# Patient Record
Sex: MALE | Race: WHITE | URBAN - METROPOLITAN AREA
[De-identification: names, ages, dates, MRNs, and addresses within clinical notes are randomized per-mention and may not be internally consistent; named-entity substitution may affect disease eponyms.]

---

## 2024-02-21 ENCOUNTER — APPOINTMENT (OUTPATIENT)
Dept: PRIMARY CARE | Facility: CLINIC | Age: 34
End: 2024-02-21
Payer: COMMERCIAL

## 2024-02-21 ENCOUNTER — OFFICE VISIT (OUTPATIENT)
Dept: PRIMARY CARE | Facility: CLINIC | Age: 34
End: 2024-02-21
Payer: COMMERCIAL

## 2024-02-21 VITALS
HEIGHT: 68 IN | SYSTOLIC BLOOD PRESSURE: 129 MMHG | WEIGHT: 129.2 LBS | HEART RATE: 96 BPM | BODY MASS INDEX: 19.58 KG/M2 | OXYGEN SATURATION: 99 % | DIASTOLIC BLOOD PRESSURE: 74 MMHG

## 2024-02-21 DIAGNOSIS — Z23 NEED FOR INFLUENZA VACCINATION: ICD-10-CM

## 2024-02-21 DIAGNOSIS — F41.9 ANXIETY: ICD-10-CM

## 2024-02-21 DIAGNOSIS — Z23 NEED FOR TDAP VACCINATION: ICD-10-CM

## 2024-02-21 DIAGNOSIS — Z00.00 ROUTINE GENERAL MEDICAL EXAMINATION AT A HEALTH CARE FACILITY: Primary | ICD-10-CM

## 2024-02-21 DIAGNOSIS — F98.8 ATTENTION DEFICIT DISORDER OF ADULT: ICD-10-CM

## 2024-02-21 PROCEDURE — 90715 TDAP VACCINE 7 YRS/> IM: CPT | Performed by: STUDENT IN AN ORGANIZED HEALTH CARE EDUCATION/TRAINING PROGRAM

## 2024-02-21 PROCEDURE — 90686 IIV4 VACC NO PRSV 0.5 ML IM: CPT | Performed by: STUDENT IN AN ORGANIZED HEALTH CARE EDUCATION/TRAINING PROGRAM

## 2024-02-21 PROCEDURE — 90472 IMMUNIZATION ADMIN EACH ADD: CPT | Performed by: STUDENT IN AN ORGANIZED HEALTH CARE EDUCATION/TRAINING PROGRAM

## 2024-02-21 PROCEDURE — 99214 OFFICE O/P EST MOD 30 MIN: CPT | Performed by: STUDENT IN AN ORGANIZED HEALTH CARE EDUCATION/TRAINING PROGRAM

## 2024-02-21 PROCEDURE — 90471 IMMUNIZATION ADMIN: CPT | Performed by: STUDENT IN AN ORGANIZED HEALTH CARE EDUCATION/TRAINING PROGRAM

## 2024-02-21 PROCEDURE — 99395 PREV VISIT EST AGE 18-39: CPT | Performed by: STUDENT IN AN ORGANIZED HEALTH CARE EDUCATION/TRAINING PROGRAM

## 2024-02-21 ASSESSMENT — ANXIETY QUESTIONNAIRES
5. BEING SO RESTLESS THAT IT IS HARD TO SIT STILL: NEARLY EVERY DAY
7. FEELING AFRAID AS IF SOMETHING AWFUL MIGHT HAPPEN: SEVERAL DAYS
1. FEELING NERVOUS, ANXIOUS, OR ON EDGE: MORE THAN HALF THE DAYS
6. BECOMING EASILY ANNOYED OR IRRITABLE: MORE THAN HALF THE DAYS
3. WORRYING TOO MUCH ABOUT DIFFERENT THINGS: NEARLY EVERY DAY
GAD7 TOTAL SCORE: 17
2. NOT BEING ABLE TO STOP OR CONTROL WORRYING: NEARLY EVERY DAY
IF YOU CHECKED OFF ANY PROBLEMS ON THIS QUESTIONNAIRE, HOW DIFFICULT HAVE THESE PROBLEMS MADE IT FOR YOU TO DO YOUR WORK, TAKE CARE OF THINGS AT HOME, OR GET ALONG WITH OTHER PEOPLE: VERY DIFFICULT
4. TROUBLE RELAXING: NEARLY EVERY DAY

## 2024-02-21 NOTE — PROGRESS NOTES
"Subjective   Patient ID: Cb Lan is a 33 y.o. male who presents for Annual Exam (CPE. Chest pain possible heart burn.).    Last Physical : ____ Years ago     Pt's PMH, PSH, SH, FH , meds and allergies was obtained / reviewed and updated .     Dental  Visits : Y  Vision issues : N  Hearing issues : N    Immunizations : Y    Diet :  Could be better   Exercise:  Not regularly  Weight concerns : None    Alcohol: as noted in   Tobacco: as noted in   Recreational drugs :  None /as noted in    ===============================    Chest pain , right sided, intermittent , transient, non exertional , non radiating . Once a day   Does not experience pain with physical activity   No FH of premature cardiac disease    Chronic heart burn   ==================================    Visit Vitals  /74   Pulse 96   Ht 1.727 m (5' 8\")   Wt 58.6 kg (129 lb 3.2 oz)   SpO2 99%   BMI 19.64 kg/m²   Smoking Status Some Days   BSA 1.68 m²      =====================  Review of Systems:    Constitutional: no chills, no fever and no night sweats.     Eyes: no blurred vision and no eyesight problems.     ENT: no hearing loss, no nasal congestion, no nasal discharge, no hoarseness and no sore throat.     Cardiovascular: no chest pain, no intermittent leg claudication, no lower extremity edema, no palpitations and no syncope.     Respiratory: no cough, no shortness of breath during exertion, no shortness of breath at rest and no wheezing.     Gastrointestinal: no abdominal pain, no constipation, no blood in stools, no diarrhea, no melena, no nausea, no rectal pain and no vomiting.     Genitourinary: no dysuria, no change in urinary frequency, no urinary hesitancy and no feelings of urinary urgency.     Musculoskeletal: no arthralgias, no back pain and no myalgias.     Integumentary: no new skin lesions and no rashes.     Neurological: no difficulty walking, no headache, no limb weakness, no numbness and no tingling.     Psychiatric: no " anxiety, no depression, no anhedonia and no substance use disorders.     Endocrine: no recent weight gain and no recent weight loss.     Hematologic/Lymphatic: no tendency for easy bruising and no swollen glands.          All other systems have been reviewed and are negative for complaint.    =====================================================    Physical exam :    Constitutional: Alert and in no acute distress. Well developed, well nourished.     Eyes: Normal external exam. Pupils were equal in size, round, reactive to light (PERRL) with normal accommodation and extraocular movements intact (EOMI).     Ears, Nose, Mouth, and Throat: External inspection of ears and nose: Normal.  Otoscopic examination: Normal.      Neck: No neck mass was observed. Supple.     Cardiovascular: Heart rate and rhythm were normal, normal S1 and S2, no gallops, no murmurs and no pericardial rub    Pulmonary: No respiratory distress. Clear bilateral breath sounds.     Abdomen: Soft nontender; no abdominal mass palpated. No organomegaly.     Musculoskeletal: No joint swelling seen, normal movements of all extremities. Range of motion: Normal.  Muscle strength/tone: Normal.      Pectus excavatum , no costochondral tenderness b/l     Neurologic: Deep tendon reflexes were 2+ and symmetric. Sensation: Normal.     Psychiatric: Judgment and insight: Intact. Mood and affect: Normal.        Assessment/Plan    Diagnoses and all orders for this visit:  Routine general medical examination at a health care facility  -     CBC; Future  -     Comprehensive Metabolic Panel; Future  -     Hemoglobin A1C; Future  -     Lipid Panel; Future  -     TSH with reflex to Free T4 if abnormal; Future  Need for influenza vaccination  -     Flu vaccine (IIV4) age 6 months and greater, preservative free  Attention deficit disorder of adult  -     Referral to Psychiatry; Future  Anxiety       34 y/o male presents to [facility] , [facility]     Chest pain  : atypical ,  transient . Less likely to be cardiac since he does not experience pain with exertion and Fh of premature cardiac disease   ? GERD / anxiety   Anxiety : GAD7 : 18  Pt reports difficulty communication with others , feels restless   Referred to Psychiatry for further mgmt     HM :   Vaccines:  -Tdap : given today   -Flu :  given today       Cancer screenings:  None at this age       General preventive care discussed which includes regular exercise, healthy eating habits, to include more of plant based diet, to include foods of all colors  , limiting excessive red meat intake , staying active to maintain a weight that is appropriate for his/ her height / making efforts to reach an ideal weight for height,  avoidance of smoking, excess alcohol consumption, avoidance of recreational drugs, safe and responsible sexual relationships and practices.     Calcium + Vit D supplements recommended   Regular exercise recommended   Healthy eating choices discussed and recommended       Conditions addressed and mgmt as noted above.  Pertinent labs, images/ imaging reports , chart review was done .   Age appropriate labs / labs for mgmt of chronic medical conditions ordered, further mgmt pending the results.

## 2024-03-11 ENCOUNTER — TELEPHONE (OUTPATIENT)
Dept: PRIMARY CARE | Facility: CLINIC | Age: 34
End: 2024-03-11

## 2024-06-17 ENCOUNTER — HOSPITAL ENCOUNTER (OUTPATIENT)
Dept: RADIOLOGY | Facility: CLINIC | Age: 34
Discharge: HOME | End: 2024-06-17
Payer: COMMERCIAL

## 2024-06-17 ENCOUNTER — APPOINTMENT (OUTPATIENT)
Dept: PRIMARY CARE | Facility: CLINIC | Age: 34
End: 2024-06-17
Payer: COMMERCIAL

## 2024-06-17 VITALS
WEIGHT: 129.8 LBS | OXYGEN SATURATION: 98 % | SYSTOLIC BLOOD PRESSURE: 111 MMHG | HEART RATE: 69 BPM | DIASTOLIC BLOOD PRESSURE: 73 MMHG | BODY MASS INDEX: 19.74 KG/M2

## 2024-06-17 DIAGNOSIS — M79.672 FOOT PAIN, BILATERAL: ICD-10-CM

## 2024-06-17 DIAGNOSIS — M79.671 FOOT PAIN, BILATERAL: Primary | ICD-10-CM

## 2024-06-17 DIAGNOSIS — M79.672 FOOT PAIN, BILATERAL: Primary | ICD-10-CM

## 2024-06-17 DIAGNOSIS — M79.671 FOOT PAIN, BILATERAL: ICD-10-CM

## 2024-06-17 PROCEDURE — 4004F PT TOBACCO SCREEN RCVD TLK: CPT | Performed by: STUDENT IN AN ORGANIZED HEALTH CARE EDUCATION/TRAINING PROGRAM

## 2024-06-17 PROCEDURE — 73630 X-RAY EXAM OF FOOT: CPT | Mod: 50

## 2024-06-17 PROCEDURE — 99214 OFFICE O/P EST MOD 30 MIN: CPT | Performed by: STUDENT IN AN ORGANIZED HEALTH CARE EDUCATION/TRAINING PROGRAM

## 2024-06-17 ASSESSMENT — ENCOUNTER SYMPTOMS
MUSCLE WEAKNESS: 0
INABILITY TO BEAR WEIGHT: 1
TINGLING: 0
LOSS OF SENSATION: 0
LOSS OF MOTION: 1

## 2024-06-17 ASSESSMENT — PAIN SCALES - GENERAL: PAINLEVEL: 2
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
## 2025-01-28 ENCOUNTER — OFFICE VISIT (OUTPATIENT)
Dept: PRIMARY CARE | Facility: CLINIC | Age: 35
End: 2025-01-28
Payer: COMMERCIAL

## 2025-01-28 VITALS
HEART RATE: 85 BPM | WEIGHT: 131.2 LBS | DIASTOLIC BLOOD PRESSURE: 71 MMHG | HEIGHT: 68 IN | SYSTOLIC BLOOD PRESSURE: 107 MMHG | OXYGEN SATURATION: 96 % | BODY MASS INDEX: 19.88 KG/M2

## 2025-01-28 DIAGNOSIS — Z00.00 ROUTINE GENERAL MEDICAL EXAMINATION AT A HEALTH CARE FACILITY: Primary | ICD-10-CM

## 2025-01-28 DIAGNOSIS — F41.1 GENERALIZED ANXIETY DISORDER: ICD-10-CM

## 2025-01-28 PROCEDURE — 99214 OFFICE O/P EST MOD 30 MIN: CPT | Performed by: STUDENT IN AN ORGANIZED HEALTH CARE EDUCATION/TRAINING PROGRAM

## 2025-01-28 PROCEDURE — 99395 PREV VISIT EST AGE 18-39: CPT | Performed by: STUDENT IN AN ORGANIZED HEALTH CARE EDUCATION/TRAINING PROGRAM

## 2025-01-28 PROCEDURE — 3008F BODY MASS INDEX DOCD: CPT | Performed by: STUDENT IN AN ORGANIZED HEALTH CARE EDUCATION/TRAINING PROGRAM

## 2025-01-28 RX ORDER — ESCITALOPRAM OXALATE 10 MG/1
10 TABLET ORAL DAILY
Qty: 30 TABLET | Refills: 11 | Status: SHIPPED | OUTPATIENT
Start: 2025-01-28 | End: 2026-01-23

## 2025-01-28 ASSESSMENT — ANXIETY QUESTIONNAIRES
3. WORRYING TOO MUCH ABOUT DIFFERENT THINGS: NEARLY EVERY DAY
IF YOU CHECKED OFF ANY PROBLEMS ON THIS QUESTIONNAIRE, HOW DIFFICULT HAVE THESE PROBLEMS MADE IT FOR YOU TO DO YOUR WORK, TAKE CARE OF THINGS AT HOME, OR GET ALONG WITH OTHER PEOPLE: EXTREMELY DIFFICULT
1. FEELING NERVOUS, ANXIOUS, OR ON EDGE: NEARLY EVERY DAY
7. FEELING AFRAID AS IF SOMETHING AWFUL MIGHT HAPPEN: NEARLY EVERY DAY
2. NOT BEING ABLE TO STOP OR CONTROL WORRYING: NEARLY EVERY DAY
GAD7 TOTAL SCORE: 21
5. BEING SO RESTLESS THAT IT IS HARD TO SIT STILL: NEARLY EVERY DAY
6. BECOMING EASILY ANNOYED OR IRRITABLE: NEARLY EVERY DAY
4. TROUBLE RELAXING: NEARLY EVERY DAY

## 2025-01-28 ASSESSMENT — PATIENT HEALTH QUESTIONNAIRE - PHQ9
SUM OF ALL RESPONSES TO PHQ9 QUESTIONS 1 AND 2: 0
2. FEELING DOWN, DEPRESSED OR HOPELESS: NOT AT ALL
1. LITTLE INTEREST OR PLEASURE IN DOING THINGS: NOT AT ALL

## 2025-01-28 NOTE — PROGRESS NOTES
"Subjective   Patient ID: Cb Lan is a 34 y.o. adult who presents for Annual Exam ([facility]. Pt declined flu shot.).    Last Physical : ____ Years ago     Pt's PMH, PSH, SH, FH , meds and allergies was obtained / reviewed and updated .     Dental  Visits : Y  Vision issues :   Hearing issues : N    Immunizations : Y    Diet :    Exercise:  Not regularly  Weight concerns : None    Alcohol: as noted in   Tobacco: as noted in   Recreational drugs :  None /as noted in          ==================================    Visit Vitals  /71   Pulse 85   Ht 1.727 m (5' 8\")   Wt 59.5 kg (131 lb 3.2 oz)   SpO2 96%   BMI 19.95 kg/m²   Smoking Status Some Days   BSA 1.69 m²      Current Outpatient Medications   Medication Instructions    escitalopram (LEXAPRO) 10 mg, oral, Daily      Social History     Tobacco Use    Smoking status: Some Days     Types: Cigarettes    Smokeless tobacco: Never    Tobacco comments:     Cigarettes- Occasional    Substance Use Topics    Alcohol use: Not Currently      =====================  Review of Systems:    Constitutional: no chills, no fever and no night sweats.     Eyes: no blurred vision and no eyesight problems.     ENT: no hearing loss, no nasal congestion, no nasal discharge, no hoarseness and no sore throat.     Cardiovascular: no chest pain, no intermittent leg claudication, no lower extremity edema, no palpitations and no syncope.     Respiratory: no cough, no shortness of breath during exertion, no shortness of breath at rest and no wheezing.     Gastrointestinal: no abdominal pain, no constipation, no blood in stools, no diarrhea, no melena, no nausea, no rectal pain and no vomiting.     Genitourinary: no dysuria, no change in urinary frequency, no urinary hesitancy and no feelings of urinary urgency.     Musculoskeletal: no arthralgias, no back pain and no myalgias.     Integumentary: no new skin lesions and no rashes.     Neurological: no difficulty walking, no headache, no " limb weakness, no numbness and no tingling.     Psychiatric: no anxiety, no depression, no anhedonia and no substance use disorders.     Endocrine: no recent weight gain and no recent weight loss.     Hematologic/Lymphatic: no tendency for easy bruising and no swollen glands.          All other systems have been reviewed and are negative for complaint.    =====================================================    Physical exam :    Constitutional: Alert and in no acute distress. Well developed, well nourished.     Eyes: Normal external exam. Pupils were equal in size, round, reactive to light (PERRL) with normal accommodation and extraocular movements intact (EOMI).     Ears, Nose, Mouth, and Throat: External inspection of ears and nose: Normal.  Otoscopic examination: Normal.      Neck: No neck mass was observed. Supple.     Cardiovascular: Heart rate and rhythm were normal, normal S1 and S2, no gallops, no murmurs and no pericardial rub    Pulmonary: No respiratory distress. Clear bilateral breath sounds.     Abdomen:  Limited due to voluntary guarding     Musculoskeletal: No joint swelling seen, normal movements of all extremities. Range of motion: Normal.  Muscle strength/tone: Normal.      Neurologic: Deep tendon reflexes were 2+ and symmetric. Sensation: Normal.     Psychiatric: Judgment and insight: Intact. Mood and affect: Normal.        Assessment/Plan    Diagnoses and all orders for this visit:  Routine general medical examination at a health care facility  -     CBC; Future  -     Comprehensive Metabolic Panel; Future  -     Hemoglobin A1C; Future  -     Lipid Panel; Future  -     TSH with reflex to Free T4 if abnormal; Future  Generalized anxiety disorder  -     escitalopram (Lexapro) 10 mg tablet; Take 1 tablet (10 mg) by mouth once daily.      35 y/o male with anxiety  presents to [facility] care , [place]      He wondered abt early screening for cancer screenings.  Guidelines discussed   Denied any FH of early  cancer in the 30s/ 40s    No screening test for Alzheimer's at this time     Was born in [facility] near [facility] and was told that he lacks enzymes to store fat .    Life stressors , lack of motivation to get out of the house, reports anxiety .  Denied any depression .     GAD7: 21  Start Rx as above        HM :   Vaccines:  -Tdap :  due 2033  -Flu :   declined     Cancer screenings : none at this age       General preventive care discussed which includes regular exercise, healthy eating habits, to include more of plant based diet, to include foods of all colors  , limiting excessive red meat intake , staying active to maintain a weight that is appropriate for his/ her height / making efforts to reach an ideal weight for height,  avoidance of smoking, excess alcohol consumption, avoidance of recreational drugs, safe and responsible sexual relationships and practices.     It is recommended to get 150 mins of  moderate intensity  ( you should be able to talk and not sing ) exercise in a week .     Calcium + Vit D supplements recommended   Regular exercise recommended         Conditions addressed and mgmt as noted above.  Pertinent labs, images/ imaging reports , chart review was done .   Age appropriate labs / labs for mgmt of chronic medical conditions ordered, further mgmt pending the results.       This note is intended for the physician writing it, as well as to communicate findings to other healthcare professionals. These notes use medical lexicon that may be misunderstood by non medical persons. Therefore, interpretations of medical notes and terminology should be approached with caution.